# Patient Record
Sex: MALE | Race: WHITE | URBAN - METROPOLITAN AREA
[De-identification: names, ages, dates, MRNs, and addresses within clinical notes are randomized per-mention and may not be internally consistent; named-entity substitution may affect disease eponyms.]

---

## 2022-11-02 ENCOUNTER — LAB OUTSIDE AN ENCOUNTER (OUTPATIENT)
Dept: URBAN - METROPOLITAN AREA CLINIC 105 | Facility: CLINIC | Age: 22
End: 2022-11-02

## 2022-11-02 ENCOUNTER — OFFICE VISIT (OUTPATIENT)
Dept: URBAN - METROPOLITAN AREA CLINIC 105 | Facility: CLINIC | Age: 22
End: 2022-11-02
Payer: COMMERCIAL

## 2022-11-02 ENCOUNTER — WEB ENCOUNTER (OUTPATIENT)
Dept: URBAN - METROPOLITAN AREA CLINIC 105 | Facility: CLINIC | Age: 22
End: 2022-11-02

## 2022-11-02 VITALS
HEART RATE: 61 BPM | HEIGHT: 78 IN | SYSTOLIC BLOOD PRESSURE: 127 MMHG | TEMPERATURE: 97 F | DIASTOLIC BLOOD PRESSURE: 77 MMHG | BODY MASS INDEX: 19.81 KG/M2 | WEIGHT: 171.2 LBS

## 2022-11-02 DIAGNOSIS — R10.11 RUQ ABDOMINAL PAIN: ICD-10-CM

## 2022-11-02 DIAGNOSIS — G89.29 OTHER CHRONIC PAIN: ICD-10-CM

## 2022-11-02 DIAGNOSIS — M54.6 PAIN IN THORACIC SPINE: ICD-10-CM

## 2022-11-02 PROCEDURE — 99204 OFFICE O/P NEW MOD 45 MIN: CPT | Performed by: INTERNAL MEDICINE

## 2022-11-03 LAB
A/G RATIO: 2
ABSOLUTE BASOPHILS: 60
ABSOLUTE EOSINOPHILS: 206
ABSOLUTE LYMPHOCYTES: 1514
ABSOLUTE MONOCYTES: 611
ABSOLUTE NEUTROPHILS: 6209
ALBUMIN: 4.6
ALKALINE PHOSPHATASE: 49
ALT (SGPT): 8
AST (SGOT): 11
BASOPHILS: 0.7
BILIRUBIN, TOTAL: 0.3
BUN/CREATININE RATIO: (no result)
BUN: 13
CALCIUM: 9.6
CARBON DIOXIDE, TOTAL: 25
CHLORIDE: 105
CREATININE: 0.94
EGFR: 118
EOSINOPHILS: 2.4
GLOBULIN, TOTAL: 2.3
GLUCOSE: 84
HEMATOCRIT: 44.2
HEMOGLOBIN: 14.8
LIPASE: 32
LYMPHOCYTES: 17.6
MCH: 28.5
MCHC: 33.5
MCV: 85.2
MONOCYTES: 7.1
MPV: 10.1
NEUTROPHILS: 72.2
PLATELET COUNT: 308
POTASSIUM: 4.4
PROTEIN, TOTAL: 6.9
RDW: 12.3
RED BLOOD CELL COUNT: 5.19
SODIUM: 140
TSH W/REFLEX TO FT4: 1.22
WHITE BLOOD CELL COUNT: 8.6

## 2022-11-08 ENCOUNTER — OFFICE VISIT (OUTPATIENT)
Dept: URBAN - METROPOLITAN AREA CLINIC 91 | Facility: CLINIC | Age: 22
End: 2022-11-08
Payer: COMMERCIAL

## 2022-11-08 DIAGNOSIS — R93.2 ABNORMAL GALLBLADDER ULTRASOUND: ICD-10-CM

## 2022-11-08 PROCEDURE — 76705 ECHO EXAM OF ABDOMEN: CPT | Performed by: INTERNAL MEDICINE

## 2022-11-10 ENCOUNTER — TELEPHONE ENCOUNTER (OUTPATIENT)
Dept: URBAN - METROPOLITAN AREA CLINIC 92 | Facility: CLINIC | Age: 22
End: 2022-11-10

## 2022-11-16 ENCOUNTER — TELEPHONE ENCOUNTER (OUTPATIENT)
Dept: URBAN - METROPOLITAN AREA CLINIC 23 | Facility: CLINIC | Age: 22
End: 2022-11-16

## 2023-01-03 ENCOUNTER — OFFICE VISIT (OUTPATIENT)
Dept: URBAN - METROPOLITAN AREA CLINIC 105 | Facility: CLINIC | Age: 23
End: 2023-01-03

## 2023-01-03 PROBLEM — 82423001: Status: ACTIVE | Noted: 2022-11-02

## 2023-02-14 ENCOUNTER — DASHBOARD ENCOUNTERS (OUTPATIENT)
Age: 23
End: 2023-02-14

## 2023-02-22 ENCOUNTER — OFFICE VISIT (OUTPATIENT)
Dept: URBAN - METROPOLITAN AREA CLINIC 105 | Facility: CLINIC | Age: 23
End: 2023-02-22

## 2023-02-22 VITALS — HEIGHT: 78 IN
